# Patient Record
Sex: MALE | URBAN - METROPOLITAN AREA
[De-identification: names, ages, dates, MRNs, and addresses within clinical notes are randomized per-mention and may not be internally consistent; named-entity substitution may affect disease eponyms.]

---

## 2020-09-01 ENCOUNTER — WEB ENCOUNTER (OUTPATIENT)
Dept: URBAN - METROPOLITAN AREA CLINIC 50 | Facility: CLINIC | Age: 45
End: 2020-09-01

## 2020-10-01 ENCOUNTER — OFFICE VISIT (OUTPATIENT)
Dept: URBAN - METROPOLITAN AREA CLINIC 50 | Facility: CLINIC | Age: 45
End: 2020-10-01
Payer: COMMERCIAL

## 2020-10-01 ENCOUNTER — WEB ENCOUNTER (OUTPATIENT)
Dept: URBAN - METROPOLITAN AREA CLINIC 50 | Facility: CLINIC | Age: 45
End: 2020-10-01

## 2020-10-01 VITALS
DIASTOLIC BLOOD PRESSURE: 84 MMHG | WEIGHT: 265 LBS | HEIGHT: 72 IN | TEMPERATURE: 97 F | BODY MASS INDEX: 35.89 KG/M2 | HEART RATE: 56 BPM | SYSTOLIC BLOOD PRESSURE: 119 MMHG

## 2020-10-01 DIAGNOSIS — K59.01 CONSTIPATION: ICD-10-CM

## 2020-10-01 DIAGNOSIS — Z12.11 COLON CANCER SCREENING: ICD-10-CM

## 2020-10-01 DIAGNOSIS — Z83.71 FAMILY HISTORY OF COLON POLYPS: ICD-10-CM

## 2020-10-01 PROCEDURE — 99243 OFF/OP CNSLTJ NEW/EST LOW 30: CPT | Performed by: INTERNAL MEDICINE

## 2020-10-01 PROCEDURE — 99203 OFFICE O/P NEW LOW 30 MIN: CPT | Performed by: INTERNAL MEDICINE

## 2020-10-01 RX ORDER — SODIUM, POTASSIUM,MAG SULFATES 17.5-3.13G
354ML SOLUTION, RECONSTITUTED, ORAL ORAL
Qty: 354 MILLILITER | Refills: 0 | OUTPATIENT
Start: 2020-10-01 | End: 2020-10-02

## 2020-10-01 NOTE — HPI-TODAY'S VISIT:
45 y.o. WM, works from home doing [employer], lives w/ wife, 4 children PCP Jose Smith Need a colonoscopy screening Done one w/ Dr. Antoine long time ago - normal History of hemorrhoids, which is why saw [facility] Generally speaking, w/ better diet and less constipation, hemorrhoids don't bother him No abd pain No N/V No blood in stool In Feb, N/V/D - dehydrated - freaked out - ER - IVF Constipation since childhood; more greens as older; uses fiber gummies; moves 1 / day

## 2020-10-28 ENCOUNTER — OFFICE VISIT (OUTPATIENT)
Dept: URBAN - METROPOLITAN AREA SURGERY CENTER 18 | Facility: SURGERY CENTER | Age: 45
End: 2020-10-28

## 2020-11-17 ENCOUNTER — CLAIMS CREATED FROM THE CLAIM WINDOW (OUTPATIENT)
Dept: URBAN - METROPOLITAN AREA CLINIC 4 | Facility: CLINIC | Age: 45
End: 2020-11-17
Payer: COMMERCIAL

## 2020-11-17 ENCOUNTER — OFFICE VISIT (OUTPATIENT)
Dept: URBAN - METROPOLITAN AREA SURGERY CENTER 18 | Facility: SURGERY CENTER | Age: 45
End: 2020-11-17
Payer: COMMERCIAL

## 2020-11-17 DIAGNOSIS — K63.5 BENIGN COLON POLYP: ICD-10-CM

## 2020-11-17 DIAGNOSIS — Z83.71 FAMILY HISTORY OF COLON POLYPS: ICD-10-CM

## 2020-11-17 DIAGNOSIS — K63.89 OTHER SPECIFIED DISEASES OF INTESTINE: ICD-10-CM

## 2020-11-17 DIAGNOSIS — Z12.11 COLON CANCER SCREENING: ICD-10-CM

## 2020-11-17 PROCEDURE — 45380 COLONOSCOPY AND BIOPSY: CPT | Performed by: INTERNAL MEDICINE

## 2020-11-17 PROCEDURE — G8907 PT DOC NO EVENTS ON DISCHARG: HCPCS | Performed by: INTERNAL MEDICINE

## 2020-11-17 PROCEDURE — 88305 TISSUE EXAM BY PATHOLOGIST: CPT | Performed by: PATHOLOGY

## 2020-11-17 PROCEDURE — G9935 CANC NOT DETECTD DURING SRCN: HCPCS | Performed by: INTERNAL MEDICINE

## 2021-01-28 ENCOUNTER — OFFICE VISIT (OUTPATIENT)
Dept: URBAN - METROPOLITAN AREA CLINIC 50 | Facility: CLINIC | Age: 46
End: 2021-01-28
Payer: COMMERCIAL

## 2021-01-28 ENCOUNTER — WEB ENCOUNTER (OUTPATIENT)
Dept: URBAN - METROPOLITAN AREA CLINIC 50 | Facility: CLINIC | Age: 46
End: 2021-01-28

## 2021-01-28 VITALS
BODY MASS INDEX: 34.81 KG/M2 | DIASTOLIC BLOOD PRESSURE: 94 MMHG | SYSTOLIC BLOOD PRESSURE: 145 MMHG | WEIGHT: 257 LBS | HEART RATE: 73 BPM | HEIGHT: 72 IN | TEMPERATURE: 98.7 F

## 2021-01-28 DIAGNOSIS — K92.1 RECTAL BLEEDING: ICD-10-CM

## 2021-01-28 DIAGNOSIS — K59.01 CONSTIPATION: ICD-10-CM

## 2021-01-28 PROBLEM — 14760008 CONSTIPATION: Status: ACTIVE | Noted: 2020-10-01

## 2021-01-28 PROCEDURE — 99213 OFFICE O/P EST LOW 20 MIN: CPT | Performed by: INTERNAL MEDICINE

## 2021-01-28 NOTE — HPI-OTHER HISTORIES
45 y.o. WM, not as much now, but more when made appt Back in [employer] in 93 - had impacted stool - told to take enema to get it out or go to hospital to manually take it out Created a tear back then w/ pushing it out Back w/ [facility] - saw him for tear - had c-scope - got an ointment to repair the tear 2 months ago around c-scope was experiencing some bleeding - thought would get better  However, it progressed and couldn't evacuate bowels fully When set up appt, was really bothering him Cured self w/ using enemas for 7-10 days which seemed to help evacuate bowels where didn't have bowels and lubricated the exit No bleeding  Believes was hemorrhoids and tear Sphincter discomfort; not glass like sharneeraj No

## 2023-06-21 ENCOUNTER — OFFICE VISIT (OUTPATIENT)
Dept: URBAN - METROPOLITAN AREA CLINIC 98 | Facility: CLINIC | Age: 48
End: 2023-06-21
Payer: COMMERCIAL

## 2023-06-21 ENCOUNTER — TELEPHONE ENCOUNTER (OUTPATIENT)
Dept: URBAN - METROPOLITAN AREA CLINIC 98 | Facility: CLINIC | Age: 48
End: 2023-06-21

## 2023-06-21 VITALS
DIASTOLIC BLOOD PRESSURE: 95 MMHG | WEIGHT: 250.6 LBS | BODY MASS INDEX: 33.94 KG/M2 | HEART RATE: 82 BPM | HEIGHT: 72 IN | TEMPERATURE: 98.1 F | SYSTOLIC BLOOD PRESSURE: 142 MMHG

## 2023-06-21 DIAGNOSIS — K21.9 GASTROESOPHAGEAL REFLUX DISEASE, UNSPECIFIED WHETHER ESOPHAGITIS PRESENT: ICD-10-CM

## 2023-06-21 DIAGNOSIS — R13.10 DYSPHAGIA, UNSPECIFIED TYPE: ICD-10-CM

## 2023-06-21 DIAGNOSIS — K58.1 IRRITABLE BOWEL SYNDROME WITH CONSTIPATION: ICD-10-CM

## 2023-06-21 PROBLEM — 235595009: Status: ACTIVE | Noted: 2023-06-21

## 2023-06-21 PROBLEM — 440630006: Status: ACTIVE | Noted: 2023-06-21

## 2023-06-21 PROBLEM — 40739000: Status: ACTIVE | Noted: 2023-06-21

## 2023-06-21 PROCEDURE — 99214 OFFICE O/P EST MOD 30 MIN: CPT | Performed by: INTERNAL MEDICINE

## 2023-06-21 RX ORDER — LINACLOTIDE 72 UG/1
1 CAPSULE AT LEAST 30 MINUTES BEFORE THE FIRST MEAL OF THE DAY ON AN EMPTY STOMACH CAPSULE, GELATIN COATED ORAL ONCE A DAY
Qty: 30 | Refills: 5 | OUTPATIENT
Start: 2023-06-21 | End: 2023-12-17

## 2023-06-21 RX ORDER — PANTOPRAZOLE SODIUM 40 MG/1
1 TABLET TABLET, DELAYED RELEASE ORAL ONCE A DAY
Qty: 30 | Refills: 3 | OUTPATIENT
Start: 2023-06-21

## 2023-06-21 NOTE — HPI-OTHER HISTORIES
Previously in 2021 with Dr. Garza, 45 y.o. WM, not as much now, but more when made appt Back in [employer] in 93 - had impacted stool - told to take enema to get it out or go to hospital to manually take it out Created a tear back then w/ pushing it out Back w/ [facility] - saw him for tear - had c-scope - got an ointment to repair the tear 2 months ago around c-scope was experiencing some bleeding - thought would get better  However, it progressed and couldn't evacuate bowels fully When set up appt, was really bothering him Cured self w/ using enemas for 7-10 days which seemed to help evacuate bowels where didn't have bowels and lubricated the exit No bleeding  Believes was hemorrhoids and tear Sphincter discomfort; not glass like shards .  Today on 6/21/2023, Colonoscopy completed 11/17/2020.  Findings included 1, 3 mm polyp in the ascending colon.  1, 5 mm polyp in the transverse colon.  Hemorrhoids.  Pathology results revealed hyperplastic polyps.  Recommended repeat colonoscopy in 5 to 10 years. Recent colonsocopy done with [place] on 6/16/2023.Polyps were resected. Awaiting pathology results  He reports he also had an EGD fro dysphagia- unclear on results and awaiting pathology results  Patient states for the past few months he has been having worsenine constipation He will taking fiber daily and magnesium daily  Usually trying to drink plenty of water  Usually having a BM once a day "if oliva" but usually once every 2 days  Having ot use enema and suppositories  Has taken Miralax historically- this works well per patient but he is hesitant to use due to CKD  Constipation will last 3-4 weeks.  Can have diarrhea if "uses too many suppositories" - can occur up to 2-3 days of diarrhea  Can have incontinence  Also now having panick attacks- has noticed chest discomfort as well  2-3 times/week he will feel reflux "in throat" Has developed perisistent throat clearing  Recently prescribed omeprazole 40mg once a day - has been on this for 2 months. Has noticed a slight improvement  Feels dysphagia but denies choking episodes resulting in vomiting episodes

## 2023-07-06 ENCOUNTER — WEB ENCOUNTER (OUTPATIENT)
Dept: URBAN - METROPOLITAN AREA CLINIC 98 | Facility: CLINIC | Age: 48
End: 2023-07-06

## 2023-07-06 RX ORDER — PANTOPRAZOLE SODIUM 40 MG/1
1 TABLET TABLET, DELAYED RELEASE ORAL ONCE A DAY
Qty: 30 | Refills: 3 | Status: ACTIVE | COMMUNITY
Start: 2023-06-21

## 2023-07-06 RX ORDER — LINACLOTIDE 72 UG/1
1 CAPSULE AT LEAST 30 MINUTES BEFORE THE FIRST MEAL OF THE DAY ON AN EMPTY STOMACH CAPSULE, GELATIN COATED ORAL ONCE A DAY
Qty: 90 | Refills: 3 | OUTPATIENT
Start: 2023-07-06 | End: 2024-06-30

## 2023-07-06 RX ORDER — LINACLOTIDE 72 UG/1
1 CAPSULE AT LEAST 30 MINUTES BEFORE THE FIRST MEAL OF THE DAY ON AN EMPTY STOMACH CAPSULE, GELATIN COATED ORAL ONCE A DAY
Qty: 30 | Refills: 5 | Status: ACTIVE | COMMUNITY
Start: 2023-06-21 | End: 2023-12-17

## 2023-07-06 RX ORDER — HYDROCORTISONE ACETATE 25 MG/1
1 SUPPOSITORY SUPPOSITORY RECTAL ONCE A DAY
Qty: 10 | Refills: 0 | OUTPATIENT
Start: 2023-07-06 | End: 2023-07-16

## 2023-07-27 ENCOUNTER — LAB OUTSIDE AN ENCOUNTER (OUTPATIENT)
Dept: URBAN - METROPOLITAN AREA CLINIC 98 | Facility: CLINIC | Age: 48
End: 2023-07-27

## 2023-07-27 ENCOUNTER — OFFICE VISIT (OUTPATIENT)
Dept: URBAN - METROPOLITAN AREA CLINIC 98 | Facility: CLINIC | Age: 48
End: 2023-07-27
Payer: COMMERCIAL

## 2023-07-27 VITALS
HEIGHT: 72 IN | TEMPERATURE: 98 F | WEIGHT: 253.2 LBS | HEART RATE: 79 BPM | DIASTOLIC BLOOD PRESSURE: 88 MMHG | BODY MASS INDEX: 34.29 KG/M2 | SYSTOLIC BLOOD PRESSURE: 115 MMHG

## 2023-07-27 DIAGNOSIS — R13.10 DYSPHAGIA, UNSPECIFIED TYPE: ICD-10-CM

## 2023-07-27 DIAGNOSIS — K64.4 EXTERNAL HEMORRHOIDS: ICD-10-CM

## 2023-07-27 DIAGNOSIS — K21.9 GASTROESOPHAGEAL REFLUX DISEASE, UNSPECIFIED WHETHER ESOPHAGITIS PRESENT: ICD-10-CM

## 2023-07-27 DIAGNOSIS — K58.1 IRRITABLE BOWEL SYNDROME WITH CONSTIPATION: ICD-10-CM

## 2023-07-27 PROCEDURE — 99214 OFFICE O/P EST MOD 30 MIN: CPT

## 2023-07-27 RX ORDER — LINACLOTIDE 72 UG/1
1 CAPSULE AT LEAST 30 MINUTES BEFORE THE FIRST MEAL OF THE DAY ON AN EMPTY STOMACH CAPSULE, GELATIN COATED ORAL ONCE A DAY
Qty: 30 | Refills: 5 | Status: ACTIVE | COMMUNITY
Start: 2023-06-21 | End: 2023-12-17

## 2023-07-27 RX ORDER — PANTOPRAZOLE SODIUM 40 MG/1
1 TABLET TABLET, DELAYED RELEASE ORAL ONCE A DAY
Qty: 30 | Refills: 3 | Status: ACTIVE | COMMUNITY
Start: 2023-06-21

## 2023-07-27 RX ORDER — LINACLOTIDE 72 UG/1
1 CAPSULE AT LEAST 30 MINUTES BEFORE THE FIRST MEAL OF THE DAY ON AN EMPTY STOMACH CAPSULE, GELATIN COATED ORAL ONCE A DAY
Qty: 90 | Refills: 3 | Status: ACTIVE | COMMUNITY
Start: 2023-07-06 | End: 2024-06-30

## 2023-07-27 NOTE — HPI-OTHER HISTORIES
Previously in 2021 with Dr. Garza, 45 y.o. WM, not as much now, but more when made appt Back in [employer] in 93 - had impacted stool - told to take enema to get it out or go to hospital to manually take it out Created a tear back then w/ pushing it out Back w/ [facility] - saw him for tear - had c-scope - got an ointment to repair the tear 2 months ago around c-scope was experiencing some bleeding - thought would get better  However, it progressed and couldn't evacuate bowels fully When set up appt, was really bothering him Cured self w/ using enemas for 7-10 days which seemed to help evacuate bowels where didn't have bowels and lubricated the exit No bleeding  Believes was hemorrhoids and tear Sphincter discomfort; not glass like shards .  Previously on 6/21/2023, Colonoscopy completed 11/17/2020.  Findings included 1, 3 mm polyp in the ascending colon.  1, 5 mm polyp in the transverse colon.  Hemorrhoids.  Pathology results revealed hyperplastic polyps.  Recommended repeat colonoscopy in 5 to 10 years. Recent colonsocopy done with [place] on 6/16/2023.Polyps were resected. Awaiting pathology results  He reports he also had an EGD fro dysphagia- unclear on results and awaiting pathology results  Patient states for the past few months he has been having worsenine constipation He will taking fiber daily and magnesium daily  Usually trying to drink plenty of water  Usually having a BM once a day "if oliva" but usually once every 2 days  Having ot use enema and suppositories  Has taken Miralax historically- this works well per patient but he is hesitant to use due to CKD  Constipation will last 3-4 weeks.  Can have diarrhea if "uses too many suppositories" - can occur up to 2-3 days of diarrhea  Can have incontinence  Also now having panick attacks- has noticed chest discomfort as well  2-3 times/week he will feel reflux "in throat" Has developed perisistent throat clearing  Recently prescribed omeprazole 40mg once a day - has been on this for 2 months. Has noticed a slight improvement  Feels dysphagia but denies choking episodes resulting in vomiting episodes . Today on 7/27/2023, Did receive and reviewed records.  Upper endoscopy findings included normal-appearing esophagus, 2 cm hiatal hernia, a few gastric polyps and gastritis.  Colonoscopy also completed revealed 1, 4 mm polyp in the cecum.  2, 3 to 5 mm polyps in the ascending colon. He has pathology results and will get to office  Patient states the first week of the linzess was "great" Usually having a BM daily but has noticed the past couple days he has had some straining and constipation  Hemorrhoids were doing great however during most recent exacerbation of constipation, he used an enema and this caused trauma and "hemorrhoid flare"  Using preparation h wipes and hydrocortisone cream  Has suppositories which was prescribed by Dr. Lira from [facility] pharmacy however inserting he feels is causing the hemorrhoids to flare  Taking pantoprazole 40mg once a day  Can continue to have intermittent dysphgaia but feels it has improved the acid  Denies vomiting episodes

## 2023-08-02 ENCOUNTER — WEB ENCOUNTER (OUTPATIENT)
Dept: URBAN - METROPOLITAN AREA CLINIC 98 | Facility: CLINIC | Age: 48
End: 2023-08-02

## 2023-08-22 ENCOUNTER — LAB OUTSIDE AN ENCOUNTER (OUTPATIENT)
Dept: URBAN - METROPOLITAN AREA CLINIC 96 | Facility: CLINIC | Age: 48
End: 2023-08-22

## 2023-08-31 ENCOUNTER — OFFICE VISIT (OUTPATIENT)
Dept: URBAN - METROPOLITAN AREA CLINIC 98 | Facility: CLINIC | Age: 48
End: 2023-08-31
Payer: COMMERCIAL

## 2023-08-31 ENCOUNTER — TELEPHONE ENCOUNTER (OUTPATIENT)
Dept: URBAN - METROPOLITAN AREA CLINIC 98 | Facility: CLINIC | Age: 48
End: 2023-08-31

## 2023-08-31 VITALS — HEIGHT: 72 IN | WEIGHT: 248 LBS | BODY MASS INDEX: 33.59 KG/M2

## 2023-08-31 DIAGNOSIS — K21.9 GASTROESOPHAGEAL REFLUX DISEASE, UNSPECIFIED WHETHER ESOPHAGITIS PRESENT: ICD-10-CM

## 2023-08-31 DIAGNOSIS — K64.4 EXTERNAL HEMORRHOIDS: ICD-10-CM

## 2023-08-31 DIAGNOSIS — K58.1 IRRITABLE BOWEL SYNDROME WITH CONSTIPATION: ICD-10-CM

## 2023-08-31 DIAGNOSIS — R13.10 DYSPHAGIA, UNSPECIFIED TYPE: ICD-10-CM

## 2023-08-31 DIAGNOSIS — K60.2 ANAL FISSURE: ICD-10-CM

## 2023-08-31 PROCEDURE — 99214 OFFICE O/P EST MOD 30 MIN: CPT

## 2023-08-31 RX ORDER — LINACLOTIDE 72 UG/1
1 CAPSULE AT LEAST 30 MINUTES BEFORE THE FIRST MEAL OF THE DAY ON AN EMPTY STOMACH CAPSULE, GELATIN COATED ORAL ONCE A DAY
Qty: 30 | Refills: 5 | Status: ACTIVE | COMMUNITY
Start: 2023-06-21 | End: 2023-12-17

## 2023-08-31 RX ORDER — LINACLOTIDE 72 UG/1
1 CAPSULE AT LEAST 30 MINUTES BEFORE THE FIRST MEAL OF THE DAY ON AN EMPTY STOMACH CAPSULE, GELATIN COATED ORAL ONCE A DAY
Qty: 90 | Refills: 3 | Status: ACTIVE | COMMUNITY
Start: 2023-07-06 | End: 2024-06-30

## 2023-08-31 RX ORDER — PANTOPRAZOLE SODIUM 40 MG/1
1 TABLET TABLET, DELAYED RELEASE ORAL ONCE A DAY
Qty: 30 | Refills: 3 | Status: ACTIVE | COMMUNITY
Start: 2023-06-21

## 2023-08-31 RX ORDER — LINACLOTIDE 145 UG/1
1 CAPSULE AT LEAST 30 MINUTES BEFORE THE FIRST MEAL OF THE DAY ON AN EMPTY STOMACH CAPSULE, GELATIN COATED ORAL ONCE A DAY
Qty: 90 | Refills: 3 | OUTPATIENT
Start: 2023-08-31 | End: 2024-08-25

## 2023-08-31 NOTE — PHYSICAL EXAM HENT:
Head , normocephalic , atraumatic , Face , Face within normal limits , Ears , External ears within normal limits , Nose/Nasopharynx , External nose  normal appearance , Mouth and Throat , Oral cavity appearance normal I concur with the Admission Order and I certify that services are provided in accordance with Section 42 CFR § 412.3

## 2023-08-31 NOTE — HPI-OTHER HISTORIES
Previously in 2021 with Dr. Garza, 45 y.o. WM, not as much now, but more when made appt Back in [employer] in 93 - had impacted stool - told to take enema to get it out or go to hospital to manually take it out Created a tear back then w/ pushing it out Back w/ [facility] - saw him for tear - had c-scope - got an ointment to repair the tear 2 months ago around c-scope was experiencing some bleeding - thought would get better  However, it progressed and couldn't evacuate bowels fully When set up appt, was really bothering him Cured self w/ using enemas for 7-10 days which seemed to help evacuate bowels where didn't have bowels and lubricated the exit No bleeding  Believes was hemorrhoids and tear Sphincter discomfort; not glass like shards .  Previously on 6/21/2023, Colonoscopy completed 11/17/2020.  Findings included 1, 3 mm polyp in the ascending colon.  1, 5 mm polyp in the transverse colon.  Hemorrhoids.  Pathology results revealed hyperplastic polyps.  Recommended repeat colonoscopy in 5 to 10 years. Recent colonsocopy done with [place] on 6/16/2023.Polyps were resected. Awaiting pathology results  He reports he also had an EGD fro dysphagia- unclear on results and awaiting pathology results  Patient states for the past few months he has been having worsenine constipation He will taking fiber daily and magnesium daily  Usually trying to drink plenty of water  Usually having a BM once a day "if oliva" but usually once every 2 days  Having ot use enema and suppositories  Has taken Miralax historically- this works well per patient but he is hesitant to use due to CKD  Constipation will last 3-4 weeks.  Can have diarrhea if "uses too many suppositories" - can occur up to 2-3 days of diarrhea  Can have incontinence  Also now having panick attacks- has noticed chest discomfort as well  2-3 times/week he will feel reflux "in throat" Has developed perisistent throat clearing  Recently prescribed omeprazole 40mg once a day - has been on this for 2 months. Has noticed a slight improvement  Feels dysphagia but denies choking episodes resulting in vomiting episodes . Today on 7/27/2023, Did receive and reviewed records.  Upper endoscopy findings included normal-appearing esophagus, 2 cm hiatal hernia, a few gastric polyps and gastritis.  Colonoscopy also completed revealed 1, 4 mm polyp in the cecum.  2, 3 to 5 mm polyps in the ascending colon. He has pathology results and will get to office  Patient states the first week of the linzess was "great" Usually having a BM daily but has noticed the past couple days he has had some straining and constipation  Hemorrhoids were doing great however during most recent exacerbation of constipation, he used an enema and this caused trauma and "hemorrhoid flare"  Using preparation h wipes and hydrocortisone cream  Has suppositories which was prescribed by Dr. Lira from [facility] pharmacy however inserting he feels is causing the hemorrhoids to flare  Taking pantoprazole 40mg once a day  Can continue to have intermittent dysphgaia but feels it has improved the acid  Denies vomiting episodes . Today on 8/31/2023, Patient sent pathology results.  Pathology results from EGD and colonoscopy revealed duodenal mucosa with patchy foveolar metaplasia and reactive change.  Mild chronic inflammation and reactive change seen in the stomach with no H. pylori.  Colonoscopy pathology results revealed sessile serrated lesion/adenoma in the ascending colon.  Focal reactive changes of colonic mucosa from cecum polyp.  Recommended repeat colonoscopy in 5 years Did have barium swallow which showed full column Reflux.  Mild esophageal dysmotility. Patient endorses he feels "ok"  He states he feels like he has an anal fissure- seeing some BRBPR on the toilet paper but endorses he is color blind so hard to definitively say it is blood  He is taking Linzess 145mcg at this time- has allowed for more consistent BMs  Having a BM once a day- generally does not have straining with BMs Had barium swallow which did induce some mild constipation Full collumn reflux with mild esophageal dysmotility

## 2023-09-01 ENCOUNTER — WEB ENCOUNTER (OUTPATIENT)
Dept: URBAN - METROPOLITAN AREA CLINIC 96 | Facility: CLINIC | Age: 48
End: 2023-09-01

## 2023-09-06 ENCOUNTER — WEB ENCOUNTER (OUTPATIENT)
Dept: URBAN - METROPOLITAN AREA CLINIC 98 | Facility: CLINIC | Age: 48
End: 2023-09-06

## 2023-09-06 ENCOUNTER — WEB ENCOUNTER (OUTPATIENT)
Dept: URBAN - METROPOLITAN AREA CLINIC 96 | Facility: CLINIC | Age: 48
End: 2023-09-06

## 2023-09-06 PROBLEM — 82934008: Status: ACTIVE | Noted: 2023-09-06

## 2023-09-06 RX ORDER — LINACLOTIDE 145 UG/1
1 CAPSULE AT LEAST 30 MINUTES BEFORE THE FIRST MEAL OF THE DAY ON AN EMPTY STOMACH CAPSULE, GELATIN COATED ORAL ONCE A DAY
Qty: 90 | Refills: 3
Start: 2023-08-31 | End: 2024-08-31

## 2023-09-18 ENCOUNTER — WEB ENCOUNTER (OUTPATIENT)
Dept: URBAN - METROPOLITAN AREA CLINIC 98 | Facility: CLINIC | Age: 48
End: 2023-09-18

## 2023-12-12 ENCOUNTER — OFFICE VISIT (OUTPATIENT)
Dept: URBAN - METROPOLITAN AREA TELEHEALTH 2 | Facility: TELEHEALTH | Age: 48
End: 2023-12-12

## 2024-01-05 ENCOUNTER — DASHBOARD ENCOUNTERS (OUTPATIENT)
Age: 49
End: 2024-01-05

## 2024-01-05 ENCOUNTER — OFFICE VISIT (OUTPATIENT)
Dept: URBAN - METROPOLITAN AREA TELEHEALTH 2 | Facility: TELEHEALTH | Age: 49
End: 2024-01-05
Payer: COMMERCIAL

## 2024-01-05 VITALS — HEIGHT: 72 IN

## 2024-01-05 DIAGNOSIS — R13.10 DYSPHAGIA: ICD-10-CM

## 2024-01-05 DIAGNOSIS — K60.2 ANAL FISSURE: ICD-10-CM

## 2024-01-05 DIAGNOSIS — K58.1 IRRITABLE BOWEL SYNDROME WITH CONSTIPATION: ICD-10-CM

## 2024-01-05 DIAGNOSIS — K21.9 ACID REFLUX: ICD-10-CM

## 2024-01-05 PROCEDURE — 99213 OFFICE O/P EST LOW 20 MIN: CPT | Performed by: PHYSICIAN ASSISTANT

## 2024-01-05 RX ORDER — PANTOPRAZOLE SODIUM 40 MG/1
1 TABLET TABLET, DELAYED RELEASE ORAL ONCE A DAY
Qty: 30 | Refills: 3 | Status: ACTIVE | COMMUNITY
Start: 2023-06-21

## 2024-01-05 RX ORDER — LINACLOTIDE 72 UG/1
1 CAPSULE AT LEAST 30 MINUTES BEFORE THE FIRST MEAL OF THE DAY ON AN EMPTY STOMACH CAPSULE, GELATIN COATED ORAL ONCE A DAY
Qty: 90 | Refills: 3 | Status: DISCONTINUED | COMMUNITY
Start: 2023-07-06 | End: 2024-06-30

## 2024-01-05 RX ORDER — LINACLOTIDE 145 UG/1
1 CAPSULE AT LEAST 30 MINUTES BEFORE THE FIRST MEAL OF THE DAY ON AN EMPTY STOMACH CAPSULE, GELATIN COATED ORAL ONCE A DAY
Qty: 90 | Refills: 3 | OUTPATIENT

## 2024-01-05 RX ORDER — LINACLOTIDE 145 UG/1
1 CAPSULE AT LEAST 30 MINUTES BEFORE THE FIRST MEAL OF THE DAY ON AN EMPTY STOMACH CAPSULE, GELATIN COATED ORAL ONCE A DAY
Qty: 90 | Refills: 3 | Status: ACTIVE | COMMUNITY
Start: 2023-08-31 | End: 2024-08-31

## 2024-01-05 NOTE — HPI-OTHER HISTORIES
Pathology results from EGD and colonoscopy in 6/2023 with Dr. Coley revealed duodenal mucosa with patchy foveolar metaplasia and reactive change.  Mild chronic inflammation and reactive change seen in the stomach with no H. pylori.  Colonoscopy pathology results revealed sessile serrated lesion/adenoma in the ascending colon.  Focal reactive changes of colonic mucosa from cecum polyp.  Recommended repeat colonoscopy in 5 years Did have barium swallow which showed full column Reflux.  Mild esophageal dysmotility. Pt sees CRS today for follow up - Dr. Kline - had hemorrhoidectomy at 5 week follow up was still in pain so meeting today will be her first opportunity to fully examine himm things have steadily gotten better still has some rectal pain after bm but does not incompacitate him like it did in the past he is still taking the Linzess 145mcg a day right after surgery was using the 72mcg a day he has been using Linzess 145mcg in afternoon or evenings every other day  he will then have a BM every other day